# Patient Record
Sex: FEMALE | Race: WHITE | Employment: STUDENT | URBAN - METROPOLITAN AREA
[De-identification: names, ages, dates, MRNs, and addresses within clinical notes are randomized per-mention and may not be internally consistent; named-entity substitution may affect disease eponyms.]

---

## 2021-12-28 PROCEDURE — 87591 N.GONORRHOEAE DNA AMP PROB: CPT | Performed by: OBSTETRICS & GYNECOLOGY

## 2021-12-28 PROCEDURE — 87491 CHLMYD TRACH DNA AMP PROBE: CPT | Performed by: OBSTETRICS & GYNECOLOGY

## 2021-12-28 PROCEDURE — 88175 CYTOPATH C/V AUTO FLUID REDO: CPT | Performed by: OBSTETRICS & GYNECOLOGY

## 2024-08-29 ENCOUNTER — OFFICE VISIT (OUTPATIENT)
Facility: CLINIC | Age: 24
End: 2024-08-29
Payer: COMMERCIAL

## 2024-08-29 VITALS
WEIGHT: 132 LBS | DIASTOLIC BLOOD PRESSURE: 62 MMHG | SYSTOLIC BLOOD PRESSURE: 98 MMHG | HEIGHT: 64 IN | BODY MASS INDEX: 22.53 KG/M2

## 2024-08-29 DIAGNOSIS — Q83.1 AXILLARY ACCESSORY BREAST TISSUE: ICD-10-CM

## 2024-08-29 DIAGNOSIS — N94.6 DYSMENORRHEA: Primary | ICD-10-CM

## 2024-08-29 DIAGNOSIS — Z30.015 ENCOUNTER FOR INITIAL PRESCRIPTION OF VAGINAL RING HORMONAL CONTRACEPTIVE: ICD-10-CM

## 2024-08-29 PROCEDURE — 3008F BODY MASS INDEX DOCD: CPT | Performed by: OBSTETRICS & GYNECOLOGY

## 2024-08-29 PROCEDURE — 3078F DIAST BP <80 MM HG: CPT | Performed by: OBSTETRICS & GYNECOLOGY

## 2024-08-29 PROCEDURE — 99213 OFFICE O/P EST LOW 20 MIN: CPT | Performed by: OBSTETRICS & GYNECOLOGY

## 2024-08-29 PROCEDURE — 3074F SYST BP LT 130 MM HG: CPT | Performed by: OBSTETRICS & GYNECOLOGY

## 2024-08-29 RX ORDER — ETONOGESTREL AND ETHINYL ESTRADIOL VAGINAL RING .015; .12 MG/D; MG/D
1 RING VAGINAL
Qty: 3 RING | Refills: 3 | Status: SHIPPED | OUTPATIENT
Start: 2024-08-29

## 2024-08-29 NOTE — PROGRESS NOTES
Gynecology Office Visit      Jessica Deluna is a 24 year old female  No LMP recorded (lmp unknown). (Menstrual status: Continuous Pill). (contraception:  OCP) - menses 2 months ago on continuously cycling.    HPI:     Chief Complaint   Patient presents with    Lump     Patient feels like she has always had a lump both underarms, has recently feel it more. Feels uncomfortable and during menses. Noticeable when lifting arms up, when they are down they feel like a mass.    Cramps     First two days has really bad cramps, heating pads help. States tylenol nor ibuprofen help.       Bilateral axillary lumps - tender around her menses.     Menstrual cramping since periods started     Chart and previous encounters reviewed.  HISTORY:  History reviewed. No pertinent past medical history.   History reviewed. No pertinent surgical history.   Family History   Problem Relation Age of Onset    Skin cancer Maternal Grandfather     Blood Cancer Paternal Grandmother       Social History:   Social History     Socioeconomic History    Marital status: Single   Tobacco Use    Smoking status: Never     Passive exposure: Never    Smokeless tobacco: Never   Substance and Sexual Activity    Alcohol use: Yes     Comment: monthly    Drug use: No    Sexual activity: Yes     Partners: Female     Birth control/protection: OCP        Medications (Active prior to today's visit):  Current Outpatient Medications   Medication Sig Dispense Refill    Etonogestrel-Ethinyl Estradiol (NUVARING) 0.12-0.015 MG/24HR Vaginal Ring Place 1 Ring vaginally every 21 days. 3 Ring 3    multivitamin Oral Tab Take 1 tablet by mouth daily.         Allergies:  Allergies   Allergen Reactions    Sulfa Antibiotics HIVES       Gyn:  Menarche: 13  Period Cycle (Days): every 3 months  Period Duration (Days): 5-6  Use of Birth Control (if yes, specify type): OCP  Date When Birth Control Last Used: female partner  Pap Date: 21  Pap Result Notes: 21  NEG  Follow Up Recommendation:     OB Hx:  OB History    Para Term  AB Living   0 0 0 0 0 0   SAB IAB Ectopic Multiple Live Births   0 0 0 0 0         ROS:     10 point ROS completed and was negative, except for pertinent positive and negatives stated in the HPI.    PHYSICAL EXAM:   BP 98/62   Ht 64\"   Wt 132 lb (59.9 kg)   LMP  (LMP Unknown)   BMI 22.66 kg/m²      Wt Readings from Last 6 Encounters:   24 132 lb (59.9 kg)   23 141 lb (64 kg)   22 136 lb (61.7 kg)   22 134 lb (60.8 kg)   21 132 lb (59.9 kg)   13 105 lb (47.6 kg) (51%, Z= 0.02)*     * Growth percentiles are based on [facility] (Girls, 2-20 Years) data.        Gen:  Oriented, in no acute distress    Breast Exam:  Skin normal. No scars or tattoos.  Nipples and areolar areas normal without lesions or discharge.  Breast parenchyma normal through out and bilaterally symmetrical.  Prominent breast tissue in the axilla of both arms - mobile and slightly tender.   Abdomen: no scars, scaphoid, benign without peritoneal signs, rebound tenderness,   guarding, or masses         ASSESSMENT/PLAN:     1. Dysmenorrhea    2. Axillary accessory breast tissue    3. Encounter for initial prescription of vaginal ring hormonal contraceptive  - Etonogestrel-Ethinyl Estradiol (NUVARING) 0.12-0.015 MG/24HR Vaginal Ring; Place 1 Ring vaginally every 21 days.  Dispense: 3 Ring; Refill: 3      The axillary breast tissue may be made more prominent from the oral contraceptives we did discuss a hormonal IUD but does judging from the expression on her face when I mention that this is not an option.  We switched gears to discussing NuvaRing since this would provide less hormone to her systemically and perhaps decrease the stimulation of this accessory breast tissue and she is agree to try that in the short-term    Although she has had a long history of menstrual cramping and the continuous cycling is helpful since she only has 4  periods a year we do not see an ultrasound that has been ordered to date so we will add that at her follow-up visit    Leaving for school tomorrow - will move ultrasound to [facility] break     Meds This Visit:  Requested Prescriptions     Signed Prescriptions Disp Refills    Etonogestrel-Ethinyl Estradiol (NUVARING) 0.12-0.015 MG/24HR Vaginal Ring 3 Ring 3     Sig: Place 1 Ring vaginally every 21 days.       Imaging & Referrals:  None     Return in about 3 months (around 11/29/2024) for ultrasound, Office Visit.    I have Spent 20 min total time on the day of the encounter, including:        Preparing to see patient  Obtaining and /or reviewing separately obtained history  Performing a medically appropriate examination and evaluation  Counseling and educating the patient  Ordering medications if needed    Documenting in the electronic record  Independently interpreting results and communication those results to the patient  Coordinating care with others.   Kamari Real MD  8/29/2024  3:06 PM         This note was created by [employer] voice recognition. Errors in content may be related to improper recognition by the system; efforts to review and correct have been done but errors may still exist. Please contact me with any questions.

## 2024-09-16 ENCOUNTER — TELEPHONE (OUTPATIENT)
Facility: CLINIC | Age: 24
End: 2024-09-16

## 2024-09-16 DIAGNOSIS — Z30.015 ENCOUNTER FOR INITIAL PRESCRIPTION OF VAGINAL RING HORMONAL CONTRACEPTIVE: ICD-10-CM

## 2024-09-16 RX ORDER — ETONOGESTREL AND ETHINYL ESTRADIOL VAGINAL RING .015; .12 MG/D; MG/D
1 RING VAGINAL
Qty: 3 RING | Refills: 3 | Status: SHIPPED | OUTPATIENT
Start: 2024-09-16

## 2024-09-17 DIAGNOSIS — Z30.015 ENCOUNTER FOR INITIAL PRESCRIPTION OF VAGINAL RING HORMONAL CONTRACEPTIVE: ICD-10-CM

## 2024-09-17 RX ORDER — ETONOGESTREL AND ETHINYL ESTRADIOL VAGINAL RING .015; .12 MG/D; MG/D
1 RING VAGINAL
Qty: 3 RING | Refills: 3 | Status: SHIPPED | OUTPATIENT
Start: 2024-09-17

## 2024-09-17 NOTE — PROGRESS NOTES
Patient called. Insurance not covering NuvaRing at [facility] would like it resent to [facility]. Pharmacy added to file.

## 2024-09-23 ENCOUNTER — MED REC SCAN ONLY (OUTPATIENT)
Facility: CLINIC | Age: 24
End: 2024-09-23

## 2024-10-04 ENCOUNTER — TELEPHONE (OUTPATIENT)
Facility: CLINIC | Age: 24
End: 2024-10-04

## 2024-10-04 RX ORDER — ETONOGESTREL/ETHINYL ESTRADIOL .12-.015MG
1 RING, VAGINAL VAGINAL
Qty: 1 RING | Refills: 11 | Status: SHIPPED | OUTPATIENT
Start: 2024-10-04 | End: 2024-10-07

## 2024-10-07 RX ORDER — ETONOGESTREL/ETHINYL ESTRADIOL .12-.015MG
1 RING, VAGINAL VAGINAL
Qty: 3 RING | Refills: 3 | Status: SHIPPED | OUTPATIENT
Start: 2024-10-07

## 2024-10-07 NOTE — TELEPHONE ENCOUNTER
Rx sent to incorrect pharmacy. Patient requested for it to go to [facility]'s in [place] at [address].    Resent medications to correct pharmacy.

## 2024-11-21 ENCOUNTER — TELEPHONE (OUTPATIENT)
Facility: CLINIC | Age: 24
End: 2024-11-21

## 2024-11-22 ENCOUNTER — TELEPHONE (OUTPATIENT)
Facility: CLINIC | Age: 24
End: 2024-11-22

## 2024-11-22 RX ORDER — DROSPIRENONE AND ETHINYL ESTRADIOL 0.03MG-3MG
1 KIT ORAL DAILY
Qty: 28 TABLET | Refills: 0 | Status: SHIPPED | OUTPATIENT
Start: 2024-11-22 | End: 2025-11-22

## 2024-11-22 NOTE — TELEPHONE ENCOUNTER
Patient called for refill on OCPs - due to start new pack tomorrow. Was seen by Dr. Real on 8/29/24 for dysmenorrhea and accessory breast tissue. Was switched from OCPs to nuvaring at that time to see if it would help with the accessory breast tissue. Patient used nuvaring but did not like it, requesting to go back on OCPs. Previous OCP refilled, has follow up appointment scheduled for 11/26/24.

## 2024-11-26 ENCOUNTER — NURSE ONLY (OUTPATIENT)
Facility: CLINIC | Age: 24
End: 2024-11-26
Payer: COMMERCIAL

## 2024-11-26 VITALS
HEIGHT: 64 IN | BODY MASS INDEX: 22.53 KG/M2 | SYSTOLIC BLOOD PRESSURE: 100 MMHG | WEIGHT: 132 LBS | DIASTOLIC BLOOD PRESSURE: 62 MMHG

## 2024-11-26 DIAGNOSIS — N94.6 DYSMENORRHEA: Primary | ICD-10-CM

## 2024-11-26 DIAGNOSIS — Z30.015 ENCOUNTER FOR INITIAL PRESCRIPTION OF VAGINAL RING HORMONAL CONTRACEPTIVE: ICD-10-CM

## 2024-11-26 DIAGNOSIS — Q83.1 AXILLARY ACCESSORY BREAST TISSUE: ICD-10-CM

## 2024-11-26 PROBLEM — B96.89 BACTERIAL VAGINOSIS: Status: ACTIVE | Noted: 2023-09-28

## 2024-11-26 PROBLEM — J03.90 ACUTE TONSILLITIS: Status: ACTIVE | Noted: 2021-06-12

## 2024-11-26 PROBLEM — N76.0 BACTERIAL VAGINOSIS: Status: ACTIVE | Noted: 2023-09-28

## 2024-11-26 PROCEDURE — 3074F SYST BP LT 130 MM HG: CPT

## 2024-11-26 PROCEDURE — 81003 URINALYSIS AUTO W/O SCOPE: CPT

## 2024-11-26 PROCEDURE — 99213 OFFICE O/P EST LOW 20 MIN: CPT

## 2024-11-26 PROCEDURE — 3078F DIAST BP <80 MM HG: CPT

## 2024-11-26 PROCEDURE — 3008F BODY MASS INDEX DOCD: CPT

## 2024-11-26 RX ORDER — ETONOGESTREL AND ETHINYL ESTRADIOL VAGINAL RING .015; .12 MG/D; MG/D
1 RING VAGINAL
Qty: 3 RING | Refills: 4 | Status: SHIPPED | OUTPATIENT
Start: 2024-11-26

## 2024-11-26 NOTE — PROGRESS NOTES
Gynecology Office Visit      Jessica Deluna is a 24 year old female  No LMP recorded (lmp unknown). (Menstrual status: Continuous Pill). (contraception:  OCPs)     HPI:     Chief Complaint   Patient presents with    Ultrasound     dysmenorrhea    Follow - Up     Us results       eJssica presents for follow up on dysmenorrhea and axillary breast tissue. Was seen in office on 24 and was switched to nuvaring from OCPs at that time to help with axillary breast tissue. Patient states she was never able to  the ring from the pharmacy due to insurance mix-up so she has been on the OCP ever since. Continuously cycles with OCPs to decrease the number of periods she has. Still wants to try nuvaring.    Had ultrasound earlier today in this office to evaluate dysmenorrhea, see below.    Chart and previous encounters reviewed.  HISTORY:  No past medical history on file.   No past surgical history on file.   Family History   Problem Relation Age of Onset    Skin cancer Maternal Grandfather     Blood Cancer Paternal Grandmother       Social History:   Social History     Socioeconomic History    Marital status: Single   Tobacco Use    Smoking status: Never     Passive exposure: Never    Smokeless tobacco: Never   Substance and Sexual Activity    Alcohol use: Yes     Comment: monthly    Drug use: No    Sexual activity: Yes     Partners: Female     Birth control/protection: OCP        Medications (Active prior to today's visit):  Current Outpatient Medications   Medication Sig Dispense Refill    Etonogestrel-Ethinyl Estradiol (NUVARING) 0.12-0.015 MG/24HR Vaginal Ring Place 1 Ring vaginally every 28 days. For continuous cycling, placed 1 ring vaginally every 28 days x3 months. Then remove ring for 1 week and have a period. 3 Ring 4    multivitamin Oral Tab Take 1 tablet by mouth daily. (Patient not taking: Reported on 2024)         Allergies:  Allergies[1]    Gyn:  Menarche: 13  Period Cycle (Days):  every 3 months  Period Duration (Days): 5-6  Use of Birth Control (if yes, specify type): OCP  Date When Birth Control Last Used: female partner  Pap Date: 21  Pap Result Notes: 21 NEG  Follow Up Recommendation:     OB Hx:  OB History    Para Term  AB Living   0 0 0 0 0 0   SAB IAB Ectopic Multiple Live Births   0 0 0 0 0         ROS:     10 point ROS completed and was negative, except for pertinent positive and negatives stated in the HPI.    PHYSICAL EXAM:   /62   Ht 64\"   Wt 132 lb (59.9 kg)   LMP  (LMP Unknown)   BMI 22.66 kg/m²      Wt Readings from Last 6 Encounters:   24 132 lb (59.9 kg)   24 132 lb (59.9 kg)   23 141 lb (64 kg)   22 136 lb (61.7 kg)   22 134 lb (60.8 kg)   21 132 lb (59.9 kg)        Gen:  Oriented, in no acute distress       ASSESSMENT/PLAN:     1. Dysmenorrhea  - US, Vaginal[31132]; Future  - Urinalysis [35820]    2. Axillary accessory breast tissue    3. Encounter for initial prescription of vaginal ring hormonal contraceptive    Discussed rationale for switching to nuvaring from OCPs to help with axillary breast tissue. Also discussed IUD but patient wants to try nuvaring, rx sent to correct pharmacy    Ultrasound done in office today  Retroflexed uterus  Free fluid seen  7mm endometrial stripe    Meds This Visit:  Requested Prescriptions     Signed Prescriptions Disp Refills    Etonogestrel-Ethinyl Estradiol (NUVARING) 0.12-0.015 MG/24HR Vaginal Ring 3 Ring 4     Sig: Place 1 Ring vaginally every 28 days. For continuous cycling, placed 1 ring vaginally every 28 days x3 months. Then remove ring for 1 week and have a period.       Imaging & Referrals:  [facility] TRANSVAGINAL [facility] ONLY     Return in about 1 month (around 2024) for Well Woman Exam.      Alayna Montanez, MARSHA  2024  1:05 PM      This note was created by [employer] voice recognition. Errors in content may be related to improper recognition by the  system; efforts to review and correct have been done but errors may still exist. Please contact me with any questions.    Note to patient and family   The 21st Century Cures Act makes medical notes available to patients in the interest of transparency.  However, please be advised that this is a medical document.  It is intended as [id number] communication.  It is written and medical language may contain abbreviations or verbiage that are technical and unfamiliar.  It may appear blunt or direct.  Medical documents are intended to carry relevant information, facts as evident, and the clinical opinion of the practitioner.           [1]   Allergies  Allergen Reactions    Sulfa Antibiotics HIVES

## 2024-12-23 ENCOUNTER — OFFICE VISIT (OUTPATIENT)
Facility: CLINIC | Age: 24
End: 2024-12-23
Payer: COMMERCIAL

## 2024-12-23 VITALS
BODY MASS INDEX: 22.88 KG/M2 | DIASTOLIC BLOOD PRESSURE: 84 MMHG | HEIGHT: 64 IN | SYSTOLIC BLOOD PRESSURE: 110 MMHG | WEIGHT: 134 LBS

## 2024-12-23 DIAGNOSIS — Z87.42 HISTORY OF DYSMENORRHEA: ICD-10-CM

## 2024-12-23 DIAGNOSIS — Z01.419 ENCOUNTER FOR WELL WOMAN EXAM WITH ROUTINE GYNECOLOGICAL EXAM: Primary | ICD-10-CM

## 2024-12-23 DIAGNOSIS — Z30.44 ENCOUNTER FOR SURVEILLANCE OF VAGINAL RING HORMONAL CONTRACEPTIVE DEVICE: ICD-10-CM

## 2024-12-23 DIAGNOSIS — Z11.3 SCREENING FOR STDS (SEXUALLY TRANSMITTED DISEASES): ICD-10-CM

## 2024-12-23 PROCEDURE — 87591 N.GONORRHOEAE DNA AMP PROB: CPT

## 2024-12-23 PROCEDURE — 3008F BODY MASS INDEX DOCD: CPT

## 2024-12-23 PROCEDURE — 3079F DIAST BP 80-89 MM HG: CPT

## 2024-12-23 PROCEDURE — 87491 CHLMYD TRACH DNA AMP PROBE: CPT

## 2024-12-23 PROCEDURE — 99395 PREV VISIT EST AGE 18-39: CPT

## 2024-12-23 PROCEDURE — 3074F SYST BP LT 130 MM HG: CPT

## 2024-12-23 NOTE — PROGRESS NOTES
GYN H&P     Genetic questionnaire reviewed with the patient and she will be referred for genetic counseling if the questionnaire had any positive results.    The [facility] intake form was also reviewed regarding contraception, menstrual periods, urinary health, and vaginal / sexual health    2024  12:42 PM    Chief Complaint   Patient presents with    Physical     Pt needs guidance on nuvaring insertion. Pt states she had breakthrough bleeding  lasting only one day. C/o nocturnal dehydration, lightheadedness, increased urinary frequency, was treated for UTI November.        HPI: Jessica is a 24 year old  Patient's last menstrual period was 2024 (approximate).  (contraception: nuvaring) here for her annual gyn exam.     Menses are regular.  Hx of axillary accessory breast tissue and dysmenorrhea - was switched from OCPs for vaginal ring to help with these symptoms. Pelvic ultrasound from 24 normal. Just started using nuvaring yesterday - has not been enough time to evaluate its effectiveness. Does feel the ring and thinks it might fall out - wondering if it is in far enough. Denies any pelvic or breast complaints.  Satisfied with current contraception.     Also reports dehydration and lightheadedness for the past few days. Reports she recently started drinking more water to help with the dehydration but still feels thirsty when she wakes up in the morning. Discussed drinking something the electrolytes and following up with PCP if symptoms persist.    Previous encounters and chart reviewed.     OB History    Para Term  AB Living   0 0 0 0 0 0   SAB IAB Ectopic Multiple Live Births   0 0 0 0 0       GYN hx:   Menarche: 13  Period Cycle (Days): every 3 months  Period Duration (Days): 4-5  Use of Birth Control (if yes, specify type): Nuvaring  Date When Birth Control Last Used: female partner  Pap Date: 21  Pap Result Notes: 21 NEG  Follow Up  Recommendation: due today      History reviewed. No pertinent past medical history.  History reviewed. No pertinent surgical history.  Allergies[1]  Medications Ordered Prior to Encounter[2]  Family History   Problem Relation Age of Onset    Skin cancer Maternal Grandfather     Blood Cancer Paternal Grandmother      Social History     Socioeconomic History    Marital status: Single     Spouse name: Not on file    Number of children: Not on file    Years of education: Not on file    Highest education level: Not on file   Occupational History    Not on file   Tobacco Use    Smoking status: Never     Passive exposure: Never    Smokeless tobacco: Never   Vaping Use    Vaping status: Not on file   Substance and Sexual Activity    Alcohol use: Yes     Comment: socially    Drug use: No    Sexual activity: Yes     Partners: Female     Birth control/protection: OCP   Other Topics Concern    Not on file   Social History Narrative    Not on file     Social Drivers of Health     Financial Resource Strain: Not on file   Food Insecurity: Not on file   Transportation Needs: Not on file   Physical Activity: Not on file   Stress: Not on file   Social Connections: Not on file   Housing Stability: Not on file       ROS:     Review of Systems:  General: denies fevers, chills, fatigue and malaise.   Eyes: no visual changes, denies headaches  ENT: no complaints, denies earaches, runny nose, epistaxis, throat pain or sore throat  Respiratory: denies SOB, dyspnea, cough or wheezing  Cardiovascular: denies chest pain, palpitations, exercise intolerance   GI: denies abdominal pain, diarrhea, constipation  : no complaints, denies dysuria, increased urinary frequency. Menses regular, +history of dysmenorrhea, no menorrhagia, no dyspareunia   Hematological/lymphatic: denies history of excessive bleeding or bruising, denies dizziness, lightheadedness.   Breast: denies rashes, skin changes, pain, lumps or discharge   Psychiatric: denies  depression, changes in sleep patterns, anxiety  Endocrine: denies hot or cold intolerance, mood changes   Neurological: denies changes in sight, smell, hearing or taste. Denies seizures or tremors  Immunological: denies allergies, denies anaphylaxis, or swollen lymph nodes  Musculoskeletal: denies joint pain, morning stiffness, decreased range of motion         O /84   Ht 64\"   Wt 134 lb (60.8 kg)   LMP 12/17/2024 (Approximate)   BMI 23.00 kg/m²         Wt Readings from Last 6 Encounters:   12/23/24 134 lb (60.8 kg)   11/26/24 132 lb (59.9 kg)   08/29/24 132 lb (59.9 kg)   12/19/23 141 lb (64 kg)   12/29/22 136 lb (61.7 kg)   05/20/22 134 lb (60.8 kg)     Exam:   GENERAL: well developed, well nourished, in no apparent distress, oriented.  SKIN: no rashes, no suspicious lesions  HEENT: normal  NECK: supple; no thyromegaly, no adenopathy  LUNGS: clear to auscultation  CARDIOVASCULAR: normal S1, S2, RRR  BREASTS: soft, nontender, no palpable masses or nodes, no nipple discharge, no skin changes, no axillary adenopathy  ABDOMEN: no scars,  soft, non distended; non tender, no masses  PELVIC: External Genitalia: Normal appearing, no lesions.    Vagina: normal pink mucosa, no lesions, normal clear discharge, +nuvaring in place at opening of vaginal canal - inserted further into vagina digitally.    Bladder well supported.  No  anterior or posterior hernias    Cervix: nulliparous, no lesions , No CMT     Uterus: RVRF, mobile, non tender, normal size    Adnexa: non tender, no masses, normal size    Rectal: deferred  EXTREMITIES:  non tender without edema        A/P: Patient is 24 year old female with no complaints. Here for well woman exam.            Patient counseled on:    Diet/exercise.      Self Breast Exams     Safe sex practices / and living environment     Vaccines:  Annual Flu          Pap: neg/neg - Year:  12/2021, collected today  GC/Chlamydia:  collected with pap      Meds This Visit:    Requested  Prescriptions      No prescriptions requested or ordered in this encounter       1. Encounter for well woman exam with routine gynecological exam  - ThinPrep PAP Smear B; Future    2. Screening for STDs (sexually transmitted diseases)  - Chlamydia/Gc Amplification; Future    3. Encounter for surveillance of vaginal ring hormonal contraceptive device    4. History of dysmenorrhea    Continue vaginal ring for a few months to evaluate effectiveness in relation to dysmenorrhea management - if continued dysmenorrhea, may consider laparoscopy    Discussed vaginal ring applicators available to help with insertion    Return in about 1 year (around 12/23/2025) for Well Woman Exam.    Alayna Montanez, APRN   12/23/2024  12:42 PM       This note was created by [employer] voice recognition. Errors in content may be related to improper recognition by the system; efforts to review and correct have been done but errors may still exist. Please contact me with any questions.    Note to patient and family   The 21st Century Cures Act makes medical notes available to patients in the interest of transparency.  However, please be advised that this is a medical document.  It is intended as rdho-id-yzil communication.  It is written in medical language which may contain abbreviations or verbiage that are technical and unfamiliar.  It may appear blunt or direct.  Medical documents are intended to carry relevant information, facts as evident, and the clinical opinion of the practitioner.           [1]   Allergies  Allergen Reactions    Sulfa Antibiotics HIVES   [2]   Current Outpatient Medications on File Prior to Visit   Medication Sig Dispense Refill    Etonogestrel-Ethinyl Estradiol (NUVARING) 0.12-0.015 MG/24HR Vaginal Ring Place 1 Ring vaginally every 28 days. For continuous cycling, placed 1 ring vaginally every 28 days x3 months. Then remove ring for 1 week and have a period. 3 Ring 4    multivitamin Oral Tab Take 1 tablet by mouth  daily.       No current facility-administered medications on file prior to visit.

## 2024-12-24 LAB
C TRACH DNA SPEC QL NAA+PROBE: NEGATIVE
N GONORRHOEA DNA SPEC QL NAA+PROBE: NEGATIVE

## 2025-01-02 LAB
.: NORMAL
.: NORMAL

## 2025-01-29 ENCOUNTER — TELEPHONE (OUTPATIENT)
Facility: CLINIC | Age: 25
End: 2025-01-29

## 2025-01-30 ENCOUNTER — TELEPHONE (OUTPATIENT)
Facility: CLINIC | Age: 25
End: 2025-01-30

## 2025-01-30 NOTE — TELEPHONE ENCOUNTER
Patient called to discuss vaginal contraceptive ring falling out - voicemail left for patient to call office back to discuss further

## 2025-01-31 RX ORDER — SEGESTERONE ACETATE AND ETHINYL ESTRADIOL 103; 17.4 MG/1; MG/1
1 RING VAGINAL
Qty: 1 EACH | Refills: 0 | Status: SHIPPED | OUTPATIENT
Start: 2025-01-31

## 2025-01-31 NOTE — TELEPHONE ENCOUNTER
Patient called back to discuss nuvaring - states the ring feels like it keeps almost falling out. Other than that, happy with the ring. Discussed switching over to annovera since that ring is thicker and a different material than the nuvaring. Pt accepting and rx sent to pt's preferred pharmacy

## 2025-04-15 ENCOUNTER — TELEPHONE (OUTPATIENT)
Facility: CLINIC | Age: 25
End: 2025-04-15

## 2025-04-15 NOTE — TELEPHONE ENCOUNTER
Pt called looking for advise  for birth control issues. She is currently on  Annovera and after keeping it in for 2 months she took it out for 1 weeks. After reinserting it she was sexually active and since than she has been bleeding  for about 2 weeks now. She is not sure why or what to do going forward. Pt is unable to come in for an reggie since she in at school in [place]. Please arvise

## 2025-04-15 NOTE — TELEPHONE ENCOUNTER
Patient called back to discuss bleeding after intercourse with annovera. States she had the annovera ring in x2 months and then removed it x1 week and had her normal period. She placed the ring back in the vagina after her period was done and was fine for about a week. She then had intercourse x3 over the weekend with digital stimulation only. States it could have been rougher than usual but does not recall. That evening after she was sexually active, she started having brown/dark red spotting. States she checked and confirmed the annovera ring was still in the vagina. Continues to have random spotting now, about 1 1/2 weeks after it initially started. Denies cramping pain. States the flow is light/spotting in nature and only has to use some toilet paper (not enough for a tampon). The flow varies from dark red to brown. Patient is in [place] at the moment and unable to come in for an office visit. Discussed possible causes of spotting including trauma from digital sexual stimulation or infection. Recommended patient to take out vaginal ring to let her body have a period and reinsert after a week. If spotting continues, RTC or recommend following up at a clinic near her.